# Patient Record
Sex: FEMALE | Race: AMERICAN INDIAN OR ALASKA NATIVE
[De-identification: names, ages, dates, MRNs, and addresses within clinical notes are randomized per-mention and may not be internally consistent; named-entity substitution may affect disease eponyms.]

---

## 2019-06-19 ENCOUNTER — HOSPITAL ENCOUNTER (INPATIENT)
Dept: HOSPITAL 5 - ED | Age: 34
LOS: 2 days | Discharge: HOME | DRG: 546 | End: 2019-06-21
Attending: INTERNAL MEDICINE | Admitting: INTERNAL MEDICINE
Payer: MEDICAID

## 2019-06-19 DIAGNOSIS — D64.9: ICD-10-CM

## 2019-06-19 DIAGNOSIS — E87.6: ICD-10-CM

## 2019-06-19 DIAGNOSIS — M32.9: Primary | ICD-10-CM

## 2019-06-19 DIAGNOSIS — G62.9: ICD-10-CM

## 2019-06-19 DIAGNOSIS — F11.23: ICD-10-CM

## 2019-06-19 DIAGNOSIS — Z86.711: ICD-10-CM

## 2019-06-19 DIAGNOSIS — Z82.49: ICD-10-CM

## 2019-06-19 DIAGNOSIS — Z96.642: ICD-10-CM

## 2019-06-19 DIAGNOSIS — A08.4: ICD-10-CM

## 2019-06-19 DIAGNOSIS — I10: ICD-10-CM

## 2019-06-19 DIAGNOSIS — Z89.512: ICD-10-CM

## 2019-06-19 LAB
ALBUMIN SERPL-MCNC: 3.7 G/DL (ref 3.9–5)
ALT SERPL-CCNC: 6 UNITS/L (ref 7–56)
APTT BLD: 26.5 SEC. (ref 24.2–36.6)
BASOPHILS # (AUTO): 0 K/MM3 (ref 0–0.1)
BASOPHILS NFR BLD AUTO: 0.6 % (ref 0–1.8)
BENZODIAZEPINES SCREEN,URINE: (no result)
BILIRUB UR QL STRIP: (no result)
BLOOD UR QL VISUAL: (no result)
BUN SERPL-MCNC: 8 MG/DL (ref 7–17)
BUN/CREAT SERPL: 20 %
CALCIUM SERPL-MCNC: 9.5 MG/DL (ref 8.4–10.2)
EOSINOPHIL # BLD AUTO: 0 K/MM3 (ref 0–0.4)
EOSINOPHIL NFR BLD AUTO: 0.1 % (ref 0–4.3)
HCT VFR BLD CALC: 36.8 % (ref 30.3–42.9)
HEMOLYSIS INDEX: 6
HGB BLD-MCNC: 12.4 GM/DL (ref 10.1–14.3)
INR PPP: 1.19 (ref 0.87–1.13)
LYMPHOCYTES # BLD AUTO: 0.4 K/MM3 (ref 1.2–5.4)
LYMPHOCYTES NFR BLD AUTO: 5.3 % (ref 13.4–35)
MCHC RBC AUTO-ENTMCNC: 34 % (ref 30–34)
MCV RBC AUTO: 87 FL (ref 79–97)
METHADONE SCREEN,URINE: (no result)
MONOCYTES # (AUTO): 0.5 K/MM3 (ref 0–0.8)
MONOCYTES % (AUTO): 6.9 % (ref 0–7.3)
MUCOUS THREADS #/AREA URNS HPF: (no result) /HPF
OPIATE SCREEN,URINE: (no result)
PH UR STRIP: 9 [PH] (ref 5–7)
PLATELET # BLD: 315 K/MM3 (ref 140–440)
PROT UR STRIP-MCNC: (no result) MG/DL
RBC # BLD AUTO: 4.21 M/MM3 (ref 3.65–5.03)
RBC #/AREA URNS HPF: 1 /HPF (ref 0–6)
UROBILINOGEN UR-MCNC: < 2 MG/DL (ref ?–2)
WBC #/AREA URNS HPF: < 1 /HPF (ref 0–6)

## 2019-06-19 PROCEDURE — 96374 THER/PROPH/DIAG INJ IV PUSH: CPT

## 2019-06-19 PROCEDURE — 80307 DRUG TEST PRSMV CHEM ANLYZR: CPT

## 2019-06-19 PROCEDURE — 85730 THROMBOPLASTIN TIME PARTIAL: CPT

## 2019-06-19 PROCEDURE — 83690 ASSAY OF LIPASE: CPT

## 2019-06-19 PROCEDURE — 80053 COMPREHEN METABOLIC PANEL: CPT

## 2019-06-19 PROCEDURE — 80048 BASIC METABOLIC PNL TOTAL CA: CPT

## 2019-06-19 PROCEDURE — 84484 ASSAY OF TROPONIN QUANT: CPT

## 2019-06-19 PROCEDURE — 85025 COMPLETE CBC W/AUTO DIFF WBC: CPT

## 2019-06-19 PROCEDURE — 86038 ANTINUCLEAR ANTIBODIES: CPT

## 2019-06-19 PROCEDURE — 36415 COLL VENOUS BLD VENIPUNCTURE: CPT

## 2019-06-19 PROCEDURE — 86225 DNA ANTIBODY NATIVE: CPT

## 2019-06-19 PROCEDURE — 84703 CHORIONIC GONADOTROPIN ASSAY: CPT

## 2019-06-19 PROCEDURE — 99285 EMERGENCY DEPT VISIT HI MDM: CPT

## 2019-06-19 PROCEDURE — 85027 COMPLETE CBC AUTOMATED: CPT

## 2019-06-19 PROCEDURE — 86140 C-REACTIVE PROTEIN: CPT

## 2019-06-19 PROCEDURE — 93010 ELECTROCARDIOGRAM REPORT: CPT

## 2019-06-19 PROCEDURE — 96375 TX/PRO/DX INJ NEW DRUG ADDON: CPT

## 2019-06-19 PROCEDURE — 85610 PROTHROMBIN TIME: CPT

## 2019-06-19 PROCEDURE — 93005 ELECTROCARDIOGRAM TRACING: CPT

## 2019-06-19 PROCEDURE — 81001 URINALYSIS AUTO W/SCOPE: CPT

## 2019-06-19 PROCEDURE — 71045 X-RAY EXAM CHEST 1 VIEW: CPT

## 2019-06-19 PROCEDURE — 85652 RBC SED RATE AUTOMATED: CPT

## 2019-06-19 PROCEDURE — 96376 TX/PRO/DX INJ SAME DRUG ADON: CPT

## 2019-06-19 RX ADMIN — DOCUSATE SODIUM SCH MG: 100 CAPSULE, LIQUID FILLED ORAL at 22:40

## 2019-06-19 RX ADMIN — Medication SCH ML: at 22:24

## 2019-06-19 RX ADMIN — MORPHINE SULFATE SCH MG: 30 TABLET, FILM COATED, EXTENDED RELEASE ORAL at 22:22

## 2019-06-19 RX ADMIN — ASPIRIN SCH MG: 81 TABLET, CHEWABLE ORAL at 22:23

## 2019-06-19 RX ADMIN — Medication SCH UNIT: at 22:22

## 2019-06-19 RX ADMIN — HYDROXYCHLOROQUINE SULFATE SCH MG: 200 TABLET ORAL at 22:38

## 2019-06-19 RX ADMIN — FAMOTIDINE SCH MG: 10 INJECTION, SOLUTION INTRAVENOUS at 22:22

## 2019-06-19 RX ADMIN — DEXTROSE AND SODIUM CHLORIDE SCH MLS/HR: 5; .9 INJECTION, SOLUTION INTRAVENOUS at 22:24

## 2019-06-19 RX ADMIN — AMLODIPINE BESYLATE SCH MG: 5 TABLET ORAL at 22:22

## 2019-06-19 RX ADMIN — MYCOPHENOLATE MOFETIL SCH MG: 500 TABLET ORAL at 22:39

## 2019-06-19 NOTE — EMERGENCY DEPARTMENT REPORT
HPI





- General


Chief Complaint: Pain General


Time Seen by Provider: 06/19/19 15:11





- HPI


HPI: 





33-year-old -American female with multiple medical issues, including left

TKA, lupus, pulmonary embolism, on chronic opiate therapy, for chronic pain that

is nonspecific.  She takes oxycodone at home, and MS Contin, 45 mg daily.  She 

ran out of her pain medication yesterday.  She presents to ED with generalized 

body ache, hurting all over, nausea, vomiting, diarrhea, abdominal discomfort.  

She Stated her symptoms are consistent with prior SLE exacerbation, which 

usually requires admission prior to decompensation..





ED Past Medical Hx





- Past Medical History


Previous Medical History?: Yes


Hx Hypertension: Yes


Hx Congestive Heart Failure: No


Hx Diabetes: No


Hx Asthma: No


Hx COPD: No


Hx HIV: No


Additional medical history: Possible autoimmune disease





- Surgical History


Past Surgical History?: Yes


Additional Surgical History: BKA (L).  Hip replacement





- Social History


Smoking Status: Never Smoker


Substance Use Type: None





- Medications


Home Medications: 


                                Home Medications











 Medication  Instructions  Recorded  Confirmed  Last Taken  Type


 


Docusate Sodium [Colace] 100 mg PO DAILY 12/21/14 06/19/19 06/19/19 History


 


Ferrous Gluconate [Ferrous 65 mg PO DAILY 12/21/14 06/19/19 06/19/19 History





Gluconate 325 MG tab]     


 


Gabapentin [Neurontin] 900 mg PO TID 12/21/14 06/19/19 Unknown History


 


amLODIPine [Norvasc] 5 mg PO DAILY #30 tab 12/26/14 06/19/19 Unknown Rx


 


Aspirin [Aspirin BABY CHEW TAB] 81 mg PO DAILY 06/19/19 06/19/19 Unknown History


 


Cholecalciferol Vit D3 [Vitamin D3 1,000 unit PO DAILY 06/19/19 06/19/19 Unknown

 History





1,000 UNIT TAB]     


 


Hydroxychloroquine [Plaquenil] 300 mg PO DAILY 06/19/19 06/19/19 Unknown History


 


Morphine [Morphine TAB] 15 mg PO Q12H 06/19/19 06/19/19 06/17/19 History


 


Morphine [Morphine TAB] 30 mg PO Q12H 06/19/19 06/19/19 06/17/19 History


 


Mycophenolate [Cellcept] 1,000 mg PO BID 06/19/19 06/19/19 Unknown History


 


Oxycodone HCl [oxyCODONE] 10 mg PO Q8H PRN 06/19/19 06/19/19 06/17/19 History














ED Review of Systems


ROS: 


Stated complaint: STOMACH PAIN


Other details as noted in HPI





Comment: All other systems reviewed and negative


Constitutional: chills.  denies: see HPI, diaphoresis, fever


Respiratory: denies: cough


Cardiovascular: denies: chest pain


Gastrointestinal: abdominal pain, nausea, vomiting, diarrhea


Genitourinary: urgency





Physical Exam





- Physical Exam


Vital Signs: 


                                   Vital Signs











  06/19/19 06/19/19 06/19/19





  14:55 14:56 15:00


 


Temperature   


 


Pulse Rate 63 71 62


 


Respiratory  18 





Rate   


 


Blood Pressure  124/58 124/58


 


Blood Pressure  124/58 





[Left]   


 


O2 Sat by Pulse  100 100





Oximetry   














  06/19/19 06/19/19 06/19/19





  15:15 15:30 15:46


 


Temperature   


 


Pulse Rate 65 64 78


 


Respiratory  18 17





Rate   


 


Blood Pressure 124/65 113/54 97/45


 


Blood Pressure   





[Left]   


 


O2 Sat by Pulse 99 94 99





Oximetry   














  06/19/19 06/19/19 06/19/19





  16:00 16:11 16:15


 


Temperature  98.6 F 


 


Pulse Rate 74  63


 


Respiratory 16  12





Rate   


 


Blood Pressure 113/60  129/68


 


Blood Pressure   





[Left]   


 


O2 Sat by Pulse 96  93





Oximetry   














  06/19/19 06/19/19





  16:30 16:44


 


Temperature  


 


Pulse Rate 67 


 


Respiratory  14





Rate  


 


Blood Pressure 131/69 


 


Blood Pressure  





[Left]  


 


O2 Sat by Pulse 99 





Oximetry  











Physical Exam: 








Physical Exam: 





- General


Limitations: No Limitations


General appearance: alert, in mild distress





- Head


Head exam: Present: atraumatic, normocephalic





- Eye


Eye exam: Present: normal appearance





- ENT


ENT exam: Present: mucous membranes moist





- Neck


Neck exam: Present: normal inspection





- Respiratory


Respiratory exam: Present: normal lung sounds bilaterally.  Absent: respiratory 

distress





- Cardiovascular


Cardiovascular Exam: Present: normal rhythm, tachycardia.  Absent: systolic 

murmur, diastolic murmur, rubs, gallop





- GI/Abdominal


GI/Abdominal exam: Present: soft, normal bowel sounds





- Extremities Exam


Extremities exam: Present: normal inspection. left BKA





- Back Exam


Back exam: Present: normal inspection





- Neurological Exam


Neurological exam: Present: alert, oriented X3





- Psychiatric


Psychiatric exam: normal affect and mood





- Skin


Skin exam: Present: warm, dry, intact, normal color.  Absent: rash





ED Course


                                   Vital Signs











  06/19/19 06/19/19 06/19/19





  14:55 14:56 15:00


 


Temperature   


 


Pulse Rate 63 71 62


 


Respiratory  18 





Rate   


 


Blood Pressure  124/58 124/58


 


Blood Pressure  124/58 





[Left]   


 


O2 Sat by Pulse  100 100





Oximetry   














  06/19/19 06/19/19 06/19/19





  15:15 15:30 15:46


 


Temperature   


 


Pulse Rate 65 64 78


 


Respiratory  18 17





Rate   


 


Blood Pressure 124/65 113/54 97/45


 


Blood Pressure   





[Left]   


 


O2 Sat by Pulse 99 94 99





Oximetry   














  06/19/19 06/19/19 06/19/19





  16:00 16:11 16:15


 


Temperature  98.6 F 


 


Pulse Rate 74  63


 


Respiratory 16  12





Rate   


 


Blood Pressure 113/60  129/68


 


Blood Pressure   





[Left]   


 


O2 Sat by Pulse 96  93





Oximetry   














  06/19/19 06/19/19





  16:30 16:44


 


Temperature  


 


Pulse Rate 67 


 


Respiratory  14





Rate  


 


Blood Pressure 131/69 


 


Blood Pressure  





[Left]  


 


O2 Sat by Pulse 99 





Oximetry  














- Reevaluation(s)


Reevaluation #1: 





06/19/19 1500





Patient received IV fluid in ED, morphine treatment, Zofran, will be admitted 

for further treatment.








ED Medical Decision Making





- Lab Data


Result diagrams: 


                                 06/19/19 16:00





                                 06/19/19 16:00





- Medical Decision Making











Patient received IV fluid in ED, morphine treatment, Zofran, will be admitted 

for further treatment.





Critical care attestation.: 


If time is entered above; I have spent that time in minutes in the direct care o

f this critically ill patient, excluding procedure time.








ED Disposition


Clinical Impression: 


 Exacerbation of systemic lupus





Disposition: DC-09 OP ADMIT IP TO THIS HOSP


Is pt being admited?: No


Does the pt Need Aspirin: No


Condition: Stable

## 2019-06-19 NOTE — XRAY REPORT
PROCEDURE: XR CHEST 1V AP 

 

TECHNIQUE:  Chest radiograph single view.  

 

HISTORY: Chest Pain 

 

COMPARISONS: None . 

 

FINDINGS: 

 

Heart: Normal. 

Mediastinum/Vessels: Normal. 

Lungs/Pleural space:  Normal. 

Bony thorax: No acute osseous abnormality. 

Life support devices: None. 

 

IMPRESSION:  No acute cardiopulmonary abnormality. 

 

This document is electronically signed by Bridget Simon MD., June 19 2019 06:04:37 PM ET

## 2019-06-20 LAB
BUN SERPL-MCNC: 6 MG/DL (ref 7–17)
BUN/CREAT SERPL: 12 %
CALCIUM SERPL-MCNC: 9.1 MG/DL (ref 8.4–10.2)
HCT VFR BLD CALC: 32 % (ref 30.3–42.9)
HEMOLYSIS INDEX: 1
HGB BLD-MCNC: 10.9 GM/DL (ref 10.1–14.3)
MCHC RBC AUTO-ENTMCNC: 34 % (ref 30–34)
MCV RBC AUTO: 87 FL (ref 79–97)
PLATELET # BLD: 291 K/MM3 (ref 140–440)
RBC # BLD AUTO: 3.67 M/MM3 (ref 3.65–5.03)

## 2019-06-20 RX ADMIN — Medication SCH ML: at 10:02

## 2019-06-20 RX ADMIN — ASPIRIN SCH MG: 81 TABLET, CHEWABLE ORAL at 09:58

## 2019-06-20 RX ADMIN — DEXTROSE AND SODIUM CHLORIDE SCH MLS/HR: 5; .9 INJECTION, SOLUTION INTRAVENOUS at 06:27

## 2019-06-20 RX ADMIN — Medication SCH: at 12:00

## 2019-06-20 RX ADMIN — FAMOTIDINE SCH MG: 10 INJECTION, SOLUTION INTRAVENOUS at 10:14

## 2019-06-20 RX ADMIN — Medication SCH ML: at 21:12

## 2019-06-20 RX ADMIN — MORPHINE SULFATE SCH MG: 30 TABLET, FILM COATED, EXTENDED RELEASE ORAL at 21:15

## 2019-06-20 RX ADMIN — DEXTROSE AND SODIUM CHLORIDE SCH MLS/HR: 5; .9 INJECTION, SOLUTION INTRAVENOUS at 16:28

## 2019-06-20 RX ADMIN — FAMOTIDINE SCH MG: 10 INJECTION, SOLUTION INTRAVENOUS at 21:12

## 2019-06-20 RX ADMIN — HYDROMORPHONE HYDROCHLORIDE PRN MG: 1 INJECTION, SOLUTION INTRAMUSCULAR; INTRAVENOUS; SUBCUTANEOUS at 23:35

## 2019-06-20 RX ADMIN — DOCUSATE SODIUM SCH MG: 100 CAPSULE, LIQUID FILLED ORAL at 09:59

## 2019-06-20 RX ADMIN — GABAPENTIN SCH MG: 300 CAPSULE ORAL at 21:11

## 2019-06-20 RX ADMIN — MORPHINE SULFATE SCH MG: 30 TABLET, FILM COATED, EXTENDED RELEASE ORAL at 14:05

## 2019-06-20 RX ADMIN — Medication SCH UNIT: at 10:00

## 2019-06-20 RX ADMIN — HYDROMORPHONE HYDROCHLORIDE PRN MG: 1 INJECTION, SOLUTION INTRAMUSCULAR; INTRAVENOUS; SUBCUTANEOUS at 00:07

## 2019-06-20 RX ADMIN — MYCOPHENOLATE MOFETIL SCH MG: 500 TABLET ORAL at 09:57

## 2019-06-20 RX ADMIN — HYDROMORPHONE HYDROCHLORIDE PRN MG: 1 INJECTION, SOLUTION INTRAMUSCULAR; INTRAVENOUS; SUBCUTANEOUS at 06:21

## 2019-06-20 RX ADMIN — AMLODIPINE BESYLATE SCH: 5 TABLET ORAL at 10:13

## 2019-06-20 RX ADMIN — GABAPENTIN SCH MG: 300 CAPSULE ORAL at 14:06

## 2019-06-20 RX ADMIN — HYDROXYCHLOROQUINE SULFATE SCH MG: 200 TABLET ORAL at 09:57

## 2019-06-20 RX ADMIN — GABAPENTIN SCH MG: 300 CAPSULE ORAL at 08:53

## 2019-06-20 RX ADMIN — MYCOPHENOLATE MOFETIL SCH MG: 500 TABLET ORAL at 21:11

## 2019-06-20 NOTE — HISTORY AND PHYSICAL REPORT
CHIEF COMPLAINT:  Severe pain all over.



HISTORY OF PRESENT ILLNESS:  A 33-year-old female with a history of lupus,

hypertension, comes in for severe pain all over.  The patient feels that glucose

has flared up.  The patient is on excessive opiates in the form of MS Contin 45

mg twice a day and oxycodone 30 mg twice a day.  The patient has a left BKA and

history of pulmonary embolism.  The patient has joint pains all over.



PAST MEDICAL HISTORY:  Significant for hypertension, lupus.



PAST SURGICAL HISTORY:  BKA, left side and hip replacement.



SOCIAL HISTORY:  Does not smoke.



FAMILY HISTORY:  Hypertension.



CURRENT MEDICATIONS:  Amlodipine 5 mg once a day, morphine 45 mg q.12, also

oxycodone 10 mg p.o. q.8 hours, gabapentin 900 mg p.o. t.i.d.



REVIEW OF SYSTEMS:  Significant for pain all over, especially joints.  No chest

pain.  No shortness of breath.



PHYSICAL EXAMINATION:

GENERAL:  Young female, cooperative during examination.

VITAL SIGNS:  Blood pressure is 130/77, temperature is 99.2, pulse is 90/65,

respirations 16.

HEENT:  Unremarkable.  Pupils equal and reactive.

NECK:  Supple, no lymphadenopathy, no thyromegaly.

LUNGS:  Clear to auscultation and percussion.  Good air entry.

CARDIOVASCULAR:  S1, S2 heard.  No gallop, no murmur, no rub.  Apical impulse in

left fifth intercostal space and midclavicular line.

ABDOMEN:  Soft and benign.  No hepatosplenomegaly.  No guarding, no rigidity. 

Hernial orifices are normal.

EXTREMITIES:  Tender all of the joints, not inflamed.  Left below knee

amputation.

CENTRAL NERVOUS SYSTEM:  Normal.



LABORATORY DATA:  CBC was normal.  Potassium is 3.5.  Sodium is 141, BUN and

creatinine 8 and 0.4, ALT is 6, total protein is 8.7, albumin is 3.7.  Drug

screen is negative.  Opiates are not present in the urine.



LABORATORY DATA:  Chest x-ray shows no acute findings.



EKG shows normal sinus rhythm, heart rate of 76 per minute, no acute ST-T wave

changes.



ASSESSMENT AND PLAN:

1.  Lupus flare.  The patient is  on opiates, but no opiates in the urine drug

screen.  We will get sed rate, Ds-DNA, CRP and JADA at the basic workup.  IV

Solu-Medrol 125 q.8 started.  Also, Dilaudid 0.5 mg to 1 mg q.3 hours started.

2.  Opiate dependency.  The patient needs outpatient treatment for opiate

dependency.  Mental consult requested.

3.  Peripheral neuropathy.  Continue gabapentin and tramadol.

4.  Hypertension.  Continue Norvasc.

5.  Anticoagulation.  The patient is on Coumadin, not to be verified. 

Re-verified again whether the patient is on Coumadin.

6.  Anemia.  Continue ferrous sulfate.

7.  Hypokalemia, supplemented.

8.  Deep venous thrombosis prophylaxis, Lovenox 40 mg subcutaneous daily.





DD: 06/19/2019 

DT: 06/19/2019 

JOB# [id number]  [id number]

LUIS ANGEL/PIETRO WEN

## 2019-06-20 NOTE — PROGRESS NOTE
Assessment and Plan


Assessment and plan: 


Nausea, vomiting diarrhea


To r/o gastroenteritis versus opiate withdrawal


Admitted to med/surg





Possible viral gastroenteritis.


iv fluids


obtain stool culture





Possible opiate withdrawal


She did not get meds for 1 day


Home meds resumed





Lupus flare


Continue analgesics





Hypertension.


Monitor BP




















History


Interval history: 


Nausea, Vomiting, Diarrhea, Abd pain


Gen body pain and aches








Hospitalist Physical





- Physical exam


Narrative exam: 


Gen: Not in acute distress, lying in bed,


HEENT: Normocephalic, atraumatic


Neck: supple, no JVD


Heart: S1 and S2 reg, no murmurs, rubs or gallop


Lungs: Clear, no crackles, no wheeze


Abd: soft, non tender, non distended, normal BS


Ext: Left BKA, no clubbing, no cyanosis, 


Neuro: Awake,alert, oriented x 3, moves all ext, non focal


Psych:Normal mood








- Constitutional


Vitals: 


                                        











Temp Pulse Resp BP Pulse Ox


 


 98.3 F   70   12   93/62   96 


 


 06/20/19 04:45  06/20/19 10:13  06/20/19 04:45  06/20/19 10:13  06/20/19 08:49














Results





- Labs


CBC & Chem 7: 


                                 06/20/19 12:33





                                 06/20/19 12:33


Labs: 


                             Laboratory Last Values











WBC  6.7 K/mm3 (4.5-11.0)   06/19/19  16:00    


 


RBC  4.21 M/mm3 (3.65-5.03)   06/19/19  16:00    


 


Hgb  12.4 gm/dl (10.1-14.3)   06/19/19  16:00    


 


Hct  36.8 % (30.3-42.9)   06/19/19  16:00    


 


MCV  87 fl (79-97)   06/19/19  16:00    


 


MCH  30 pg (28-32)   06/19/19  16:00    


 


MCHC  34 % (30-34)   06/19/19  16:00    


 


RDW  13.3 % (13.2-15.2)   06/19/19  16:00    


 


Plt Count  315 K/mm3 (140-440)   06/19/19  16:00    


 


Lymph % (Auto)  5.3 % (13.4-35.0)  L  06/19/19  16:00    


 


Mono % (Auto)  6.9 % (0.0-7.3)   06/19/19  16:00    


 


Eos % (Auto)  0.1 % (0.0-4.3)   06/19/19  16:00    


 


Baso % (Auto)  0.6 % (0.0-1.8)   06/19/19  16:00    


 


Lymph #  0.4 K/mm3 (1.2-5.4)  L  06/19/19  16:00    


 


Mono #  0.5 K/mm3 (0.0-0.8)   06/19/19  16:00    


 


Eos #  0.0 K/mm3 (0.0-0.4)   06/19/19  16:00    


 


Baso #  0.0 K/mm3 (0.0-0.1)   06/19/19  16:00    


 


Seg Neutrophils %  87.1 % (40.0-70.0)  H  06/19/19  16:00    


 


Seg Neutrophils #  5.8 K/mm3 (1.8-7.7)   06/19/19  16:00    


 


ESR  58 mm/Hr (0-20)   06/19/19  21:34    


 


PT  14.8 Sec. (12.2-14.9)   06/19/19  16:00    


 


INR  1.19  (0.87-1.13)  H  06/19/19  16:00    


 


APTT  26.5 Sec. (24.2-36.6)   06/19/19  16:00    


 


Sodium  141 mmol/L (137-145)   06/19/19  16:00    


 


Potassium  3.5 mmol/L (3.6-5.0)  L  06/19/19  16:00    


 


Chloride  104.2 mmol/L ()   06/19/19  16:00    


 


Carbon Dioxide  21 mmol/L (22-30)  L  06/19/19  16:00    


 


  19 mmol/L  06/19/19  16:00    


 


BUN  8 mg/dL (7-17)   06/19/19  16:00    


 


  0.4 mg/dL (0.7-1.2)  L  06/19/19  16:00    


 


Estimated GFR  > 60 ml/min  06/19/19  16:00    


 


  20 %  06/19/19  16:00    


 


Glucose  77 mg/dL ()   06/19/19  16:00    


 


Calcium  9.5 mg/dL (8.4-10.2)   06/19/19  16:00    


 


  0.50 mg/dL (0.1-1.2)   06/19/19  16:00    


 


AST  15 units/L (5-40)   06/19/19  16:00    


 


ALT  6 units/L (7-56)  L  06/19/19  16:00    


 


  85 units/L ()   06/19/19  16:00    


 


  < 0.010 ng/mL (0.00-0.029)   06/19/19  16:00    


 


  0.90 mg/dL (0.00-1.30)   06/19/19  21:33    


 


  8.7 g/dL (6.3-8.2)  H  06/19/19  16:00    


 


  3.7 g/dL (3.9-5)  L  06/19/19  16:00    


 


  0.7 %  06/19/19  16:00    


 


  36 units/L (13-60)   06/19/19  16:00    


 


HCG, Qual  Negative  (Negative)   06/19/19  16:00    


 


  Yellow  (Yellow)   06/19/19  16:49    


 


  Clear  (Clear)   06/19/19  16:49    


 


  9.0  (5.0-7.0)  H  06/19/19  16:49    


 


Ur Specific Gravity  1.015  (1.003-1.030)   06/19/19  16:49    


 


  <15 mg/dl mg/dL (Negative)   06/19/19  16:49    


 


  Neg mg/dL (Negative)   06/19/19  16:49    


 


  20 mg/dL (Negative)   06/19/19  16:49    


 


  Neg  (Negative)   06/19/19  16:49    


 


  Neg  (Negative)   06/19/19  16:49    


 


  Neg  (Negative)   06/19/19  16:49    


 


  < 2.0 mg/dL (<2.0)   06/19/19  16:49    


 


Ur Leukocyte Esterase  Neg  (Negative)   06/19/19  16:49    


 


  < 1.0 /HPF (0.0-6.0)   06/19/19  16:49    


 


  1.0 /HPF (0.0-6.0)   06/19/19  16:49    


 


U Epithel Cells (Auto)  3.0 /HPF (0-13.0)   06/19/19  16:49    


 


  Few /HPF  06/19/19  16:49    


 


  Presumptive negative   06/19/19  16:49    


 


  Presumptive negative   06/19/19  16:49    


 


Ur Barbiturates Screen  Presumptive negative   06/19/19  16:49    


 


Ur Phencyclidine Scrn  Presumptive negative   06/19/19  16:49    


 


Ur Amphetamines Screen  Presumptive negative   06/19/19  16:49    


 


U Benzodiazepines Scrn  Presumptive negative   06/19/19  16:49    


 


  Presumptive negative   06/19/19  16:49    


 


U Marijuana (THC) Screen  Presumptive negative   06/19/19  16:49    


 


  Disclamer   06/19/19  16:49    














Active Medications





- Current Medications


Current Medications: 














Generic Name Dose Route Start Last Admin





  Trade Name Freq  PRN Reason Stop Dose Admin


 


Acetaminophen  650 mg  06/19/19 20:53 





  Tylenol  PO  





  Q4H PRN  





  Pain MILD(1-3)/Fever >100.5/HA  


 


Amlodipine Besylate  5 mg  06/19/19 21:00  06/20/19 10:13





  Norvasc  PO   Not Given





  DAILY INEZ  


 


Aspirin  81 mg  06/19/19 21:00  06/20/19 09:58





  Baby Aspirin  PO   81 mg





  DAILY INEZ   Administration


 


Cholecalciferol  1,000 unit  06/19/19 21:00  06/20/19 10:00





  Vitamin D3  PO   1,000 unit





  DAILY INEZ   Administration


 


Docusate Sodium  100 mg  06/19/19 21:00  06/20/19 09:59





  Colace  PO   100 mg





  DAILY INEZ   Administration


 


Famotidine  20 mg  06/19/19 22:00  06/20/19 10:14





  Pepcid  IV   20 mg





  BID INEZ   Administration


 


Ferrous Gluconate  324 mg  06/20/19 10:00 





  Fergon  PO  





  DAILY INEZ  


 


Gabapentin  900 mg  06/20/19 08:00  06/20/19 08:53





  Neurontin  PO   900 mg





  TID INEZ   Administration


 


Hydromorphone HCl  1 mg  06/19/19 20:53  06/20/19 06:21





  Dilaudid  IV   1 mg





  Q3H PRN   Administration





  Pain , Severe (7-10)  


 


Hydroxychloroquine Sulfate  300 mg  06/19/19 21:00  06/20/19 09:57





  Plaquenil  PO   300 mg





  DAILY INEZ   Administration


 


Dextrose/Sodium Chloride  1,000 mls @ 75 mls/hr  06/19/19 21:00  06/20/19 06:27





  D5ns  IV   75 mls/hr





  AS DIRECT INEZ   Administration


 


Methylprednisolone Sodium Succinate  125 mg  06/19/19 22:00  06/20/19 06:21





  Solu-Medrol  IV   125 mg





  Q8HR INEZ   Administration


 


Morphine Sulfate  30 mg  06/19/19 22:00  06/19/19 22:22





  Ms Contin Er  PO   30 mg





  Q12H INEZ   Administration


 


Mycophenolate Mofetil  1,000 mg  06/19/19 22:00  06/20/19 09:57





  Cellcept  PO   1,000 mg





  BID INEZ   Administration


 


Ondansetron HCl  4 mg  06/19/19 20:53  06/19/19 22:37





  Zofran  IV   4 mg





  Q8H PRN   Administration





  Nausea And Vomiting  


 


Oxycodone HCl  10 mg  06/19/19 20:49  06/20/19 09:59





  Roxicodone  PO   10 mg





  Q8H PRN   Administration





  Pain , Severe (7-10)  


 


Sodium Chloride  10 ml  06/19/19 22:00  06/20/19 10:02





  Sodium Chloride Flush Syringe 10 Ml  IV   10 ml





  BID INEZ   Administration


 


Sodium Chloride  10 ml  06/19/19 20:53 





  Sodium Chloride Flush Syringe 10 Ml  IV  





  PRN PRN  





  LINE FLUSH

## 2019-06-21 VITALS — SYSTOLIC BLOOD PRESSURE: 105 MMHG | DIASTOLIC BLOOD PRESSURE: 47 MMHG

## 2019-06-21 RX ADMIN — HYDROMORPHONE HYDROCHLORIDE PRN MG: 1 INJECTION, SOLUTION INTRAMUSCULAR; INTRAVENOUS; SUBCUTANEOUS at 09:56

## 2019-06-21 RX ADMIN — MYCOPHENOLATE MOFETIL SCH MG: 500 TABLET ORAL at 09:59

## 2019-06-21 RX ADMIN — GABAPENTIN SCH MG: 300 CAPSULE ORAL at 09:14

## 2019-06-21 RX ADMIN — Medication SCH UNIT: at 09:57

## 2019-06-21 RX ADMIN — DEXTROSE AND SODIUM CHLORIDE SCH MLS/HR: 5; .9 INJECTION, SOLUTION INTRAVENOUS at 02:09

## 2019-06-21 RX ADMIN — ASPIRIN SCH MG: 81 TABLET, CHEWABLE ORAL at 09:57

## 2019-06-21 RX ADMIN — DOCUSATE SODIUM SCH MG: 100 CAPSULE, LIQUID FILLED ORAL at 09:57

## 2019-06-21 RX ADMIN — MORPHINE SULFATE SCH MG: 30 TABLET, FILM COATED, EXTENDED RELEASE ORAL at 12:02

## 2019-06-21 RX ADMIN — FAMOTIDINE SCH MG: 10 INJECTION, SOLUTION INTRAVENOUS at 09:56

## 2019-06-21 RX ADMIN — HYDROXYCHLOROQUINE SULFATE SCH MG: 200 TABLET ORAL at 09:57

## 2019-06-21 RX ADMIN — Medication SCH MG: at 09:58

## 2019-06-21 NOTE — DISCHARGE SUMMARY
Providers





- Providers


Date of Admission: 


06/19/19 18:17





Date of discharge: 06/21/19


Attending physician: 


JOHN GOSS





[facility] 


[facility]








Hospitalization


Condition: Fair


Hospital course: 


Patient is 33-year-old -American female with multiple medical issues, 

including left AKA, lupus, pulmonary embolism, on chronic opiate therapy, for 

chronic pain that is nonspecific.  She takes multiple narcotics at home.  She 

did not take her pain medication one day prior ran out of her pain medication 

yesterday and presented .  She presents to ED with generalized body ache, 

hurting all over, nausea, vomiting, diarrhea, abdominal discomfort. She was seen

and evaluated in ED and admitted. Differential diagnosis was lupus flare and 

acute viral gastroenteritis versus opiate withdrawal. By following day felt 

better, abdominal pain hd subsided, vomiting resolved so was discharged home. 

Final diagnosis is viral gastroenteritis and lupus flare.





total time spent on discharge, 32 mins





Disposition: DC-01 TO HOME OR SELFCARE





- Discharge Diagnoses


(1) Lupus (systemic lupus erythematosus)


Status: Acute   





(2) Viral gastroenteritis


Status: Acute   





(3) SLE exacerbation


Status: Acute   





(4) Hypokalemia


Status: Acute   





Core Measure Documentation





- Palliative Care


Palliative Care/ Comfort Measures: Not Applicable





- Core Measures


Any of the following diagnoses?: none





Exam





- Constitutional


Vitals: 


                                        











Temp Pulse Resp BP Pulse Ox


 


 97.0 F L  43 L  16   105/47   98 


 


 06/21/19 05:36  06/21/19 05:36  06/21/19 05:36  06/21/19 05:36  06/21/19 05:36














Plan


Activity: no restrictions


Diet: regular


Additional Instructions: 1.Follow up with PCP in 1 week.  2.Follow up with 

Rheumatologist in 3-5 days.


Follow up with: 


[facility] [Primary Care Provider] - 3-5 Days